# Patient Record
Sex: MALE | Race: WHITE | NOT HISPANIC OR LATINO | Employment: OTHER | RURAL
[De-identification: names, ages, dates, MRNs, and addresses within clinical notes are randomized per-mention and may not be internally consistent; named-entity substitution may affect disease eponyms.]

---

## 2024-11-13 ENCOUNTER — OFFICE VISIT (OUTPATIENT)
Dept: FAMILY MEDICINE CLINIC | Facility: CLINIC | Age: 49
End: 2024-11-13
Payer: COMMERCIAL

## 2024-11-13 VITALS
HEART RATE: 76 BPM | WEIGHT: 195 LBS | TEMPERATURE: 97.5 F | HEIGHT: 70 IN | BODY MASS INDEX: 27.92 KG/M2 | RESPIRATION RATE: 18 BRPM | SYSTOLIC BLOOD PRESSURE: 128 MMHG | OXYGEN SATURATION: 98 % | DIASTOLIC BLOOD PRESSURE: 76 MMHG

## 2024-11-13 DIAGNOSIS — R19.5 LOOSE BOWEL MOVEMENTS: ICD-10-CM

## 2024-11-13 DIAGNOSIS — Z11.59 NEED FOR HEPATITIS C SCREENING TEST: ICD-10-CM

## 2024-11-13 DIAGNOSIS — Z12.5 SCREENING FOR PROSTATE CANCER: ICD-10-CM

## 2024-11-13 DIAGNOSIS — Z00.00 ANNUAL PHYSICAL EXAM: ICD-10-CM

## 2024-11-13 DIAGNOSIS — R05.3 CHRONIC COUGH: ICD-10-CM

## 2024-11-13 DIAGNOSIS — Z76.89 ENCOUNTER TO ESTABLISH CARE: Primary | ICD-10-CM

## 2024-11-13 DIAGNOSIS — Z12.11 SCREENING FOR COLON CANCER: ICD-10-CM

## 2024-11-13 DIAGNOSIS — E66.3 OVERWEIGHT: ICD-10-CM

## 2024-11-13 NOTE — PROGRESS NOTES
Office Note     Name: Emmanuel Dillard    : 1975     MRN: [id number]     Chief Complaint  Annual Exam    Subjective     History of Present Illness:  Emmanuel Dillard is a 49 y.o. male who presents today to establish care with annual physical exam.  Patient also has acute complaint of chronic loose stools and chronic cough.  Patient currently retired from his long-term occupation as a .  He does not suffer from any chronic conditions such as hypertension or diabetes.  He does have family history of prostate cancer in his father.  No family history of colon cancer.  Patient states up-to-date on dental exams as well as eye exams.  In regards to his loose stools patient states this became an issue approximately 1 year ago.  He denies that they are watery in nature but feels they are softer than they should be.  He denies any frequency of stool.  He states that he cut back on lactose and felt that maybe there was some improvement.  He denies any abdominal cramping, abdominal pain, GI bleeding or bloating.  He is due for colonoscopy for colon cancer screening.  In regards to his cough patient states that he has not really noticed but his son and wife make note that he has a dry consistent cough for quite some time now.  Patient denies any underlying respiratory conditions such as COPD or asthma.  He denies any shortness of breath.  He has never been a cigarette smoker, however in his occupation as a  he obviously was exposed to occupational hazards.  He has no further complaints or concerns    The patient is being seen for a health maintenance evaluation.    Social History  Social History     Socioeconomic History    Marital status:    Tobacco Use    Smoking status: Never    Smokeless tobacco: Never   Vaping Use    Vaping status: Never Used   Substance and Sexual Activity    Alcohol use: Yes    Drug use: Never    Sexual activity: Yes       General History  Emmanuel  does have  "regular dental visits.  He does not complain of vision problems. Last eye exam was yearly  Immunizations are up to date.  Lifestyle  Emmanuel  consumes a  well balanced .  He exercises intermittently.    Reproductive Health  Emmanuel  is sexually active.   Screening  Family history of prostate cancer: yes, father and two paternal uncles  Last colonoscopy was   Last Completed Colonoscopy       This patient has no relevant Health Maintenance data.        . Family history of colon cancer: no      Review of Systems   Constitutional:  Negative for appetite change and fatigue.   HENT:  Negative for congestion, sore throat, trouble swallowing and voice change.    Eyes:  Negative for visual disturbance.   Respiratory:  Positive for cough. Negative for chest tightness, shortness of breath and wheezing.    Cardiovascular:  Negative for chest pain, palpitations and leg swelling.   Gastrointestinal:  Negative for abdominal distention, abdominal pain, anal bleeding, blood in stool, constipation, nausea, rectal pain and vomiting.        \"Soft stool\"   Endocrine: Negative for polydipsia and polyuria.   Genitourinary:  Negative for difficulty urinating, frequency, hematuria, testicular pain and urgency.   Musculoskeletal:  Negative for arthralgias and myalgias.   Neurological:  Negative for dizziness, weakness, light-headedness, numbness and headaches.   Hematological:  Does not bruise/bleed easily.   Psychiatric/Behavioral:  Negative for agitation, self-injury, sleep disturbance and suicidal ideas. The patient is not nervous/anxious.        Objective     Vital Signs  /76 (BP Location: Left arm, Patient Position: Sitting, Cuff Size: Adult)   Pulse 76   Temp 97.5 °F (36.4 °C) (Temporal)   Resp 18   Ht 177.8 cm (70\")   Wt 88.5 kg (195 lb)   SpO2 98%   BMI 27.98 kg/m²   Estimated body mass index is 27.98 kg/m² as calculated from the following:    Height as of this encounter: 177.8 cm (70\").    Weight as of this encounter: 88.5 kg " (195 lb).    BMI is >= 25 and <30. (Overweight) The following options were offered after discussion;: exercise counseling/recommendations and nutrition counseling/recommendations      Physical Exam  Vitals reviewed.   Constitutional:       Appearance: Normal appearance.   HENT:      Head: Normocephalic and atraumatic.      Right Ear: Tympanic membrane, ear canal and external ear normal.      Left Ear: Tympanic membrane, ear canal and external ear normal.      Nose: Nose normal.      Mouth/Throat:      Mouth: Mucous membranes are moist.      Pharynx: Oropharynx is clear.   Eyes:      Pupils: Pupils are equal, round, and reactive to light.   Cardiovascular:      Rate and Rhythm: Normal rate and regular rhythm.      Pulses: Normal pulses.      Heart sounds: Normal heart sounds.   Pulmonary:      Effort: Pulmonary effort is normal.      Breath sounds: Normal breath sounds.   Abdominal:      General: Bowel sounds are normal.      Palpations: Abdomen is soft.   Genitourinary:     Comments: defer  Musculoskeletal:         General: Normal range of motion.      Cervical back: Neck supple.   Skin:     General: Skin is warm and dry.      Capillary Refill: Capillary refill takes less than 2 seconds.   Neurological:      Mental Status: He is alert and oriented to person, place, and time.   Psychiatric:         Mood and Affect: Mood normal.         Behavior: Behavior normal.         Thought Content: Thought content normal.         Judgment: Judgment normal.          Assessment and Plan     Diagnoses and all orders for this visit:    1. Encounter to establish care (Primary)    2. Annual physical exam  -     Ambulatory Referral For Screening Colonoscopy  -     Comprehensive Metabolic Panel; Future  -     CBC & Differential; Future  -     Lipid Panel; Future  -     TSH Rfx On Abnormal To Free T4; Future  -     Testosterone; Future    3. Overweight  Assessment & Plan:  Patient's (Body mass index is 27.98 kg/m².) indicates that they  are overweight with health conditions that include none . Weight is unchanged. BMI is above average; BMI management plan is completed. We discussed portion control and increasing exercise.       4. Need for hepatitis C screening test  -     Hepatitis C Antibody; Future    5. Screening for prostate cancer  -     PSA Screen; Future    6. Screening for colon cancer  -     Ambulatory Referral For Screening Colonoscopy    7. Chronic cough  Assessment & Plan:  patient states that he has not really noticed but his son and wife make note that he has a dry consistent cough for quite some time now.  Patient denies any underlying respiratory conditions such as COPD or asthma.  He denies any shortness of breath.  He has never been a cigarette smoker, however in his occupation as a  he obviously was exposed to occupational hazards. Lungs CTA on exam today  -Will send for chest PA and lateral given high exposure to carcinogens as     Orders:  -     XR Chest PA & Lateral; Future    8. Loose bowel movements  Assessment & Plan:  In regards to his loose stools patient states this became an issue approximately 1 year ago.  He denies that they are watery in nature but feels they are softer than they should be.  He denies any frequency of stool.  He states that he cut back on lactose and felt that maybe there was some improvement.  He denies any abdominal cramping, abdominal pain, GI bleeding or bloating.  -Refer for screening colonoscopy. Discussed cutting back on lactose and closely monitoring for now          Follow Up  No follow-ups on file.    JEANNIE Martinez

## 2024-11-14 ENCOUNTER — PATIENT ROUNDING (BHMG ONLY) (OUTPATIENT)
Dept: FAMILY MEDICINE CLINIC | Facility: CLINIC | Age: 49
End: 2024-11-14
Payer: COMMERCIAL

## 2024-11-14 DIAGNOSIS — R05.3 CHRONIC COUGH: ICD-10-CM

## 2024-11-14 NOTE — PROGRESS NOTES
.A [facility] message has been sent to the patient for patient rounding with [facility] [place].

## 2024-11-15 ENCOUNTER — CLINICAL SUPPORT (OUTPATIENT)
Dept: FAMILY MEDICINE CLINIC | Facility: CLINIC | Age: 49
End: 2024-11-15
Payer: COMMERCIAL

## 2024-11-15 DIAGNOSIS — Z11.59 NEED FOR HEPATITIS C SCREENING TEST: ICD-10-CM

## 2024-11-15 DIAGNOSIS — Z00.00 ANNUAL PHYSICAL EXAM: ICD-10-CM

## 2024-11-15 DIAGNOSIS — Z12.5 SCREENING FOR PROSTATE CANCER: ICD-10-CM

## 2024-11-15 PROCEDURE — 36415 COLL VENOUS BLD VENIPUNCTURE: CPT | Performed by: NURSE PRACTITIONER

## 2024-11-15 NOTE — PROGRESS NOTES
Venipuncture Blood Specimen Collection  Venipuncture performed in left arm  by Nata Ramirez MA with good hemostasis. Patient tolerated the procedure well without complications.   11/15/24   Nata Ramirez MA

## 2024-11-16 LAB
ALBUMIN SERPL-MCNC: 4.4 G/DL (ref 4.1–5.1)
ALP SERPL-CCNC: 110 IU/L (ref 44–121)
ALT SERPL-CCNC: 40 IU/L (ref 0–44)
AST SERPL-CCNC: 27 IU/L (ref 0–40)
BASOPHILS # BLD AUTO: 0 X10E3/UL (ref 0–0.2)
BASOPHILS NFR BLD AUTO: 0 %
BILIRUB SERPL-MCNC: 0.4 MG/DL (ref 0–1.2)
BUN SERPL-MCNC: 13 MG/DL (ref 6–24)
BUN/CREAT SERPL: 10 (ref 9–20)
CALCIUM SERPL-MCNC: 9.5 MG/DL (ref 8.7–10.2)
CHLORIDE SERPL-SCNC: 105 MMOL/L (ref 96–106)
CHOLEST SERPL-MCNC: 235 MG/DL (ref 100–199)
CO2 SERPL-SCNC: 23 MMOL/L (ref 20–29)
CREAT SERPL-MCNC: 1.32 MG/DL (ref 0.76–1.27)
EGFRCR SERPLBLD CKD-EPI 2021: 67 ML/MIN/1.73
EOSINOPHIL # BLD AUTO: 0.4 X10E3/UL (ref 0–0.4)
EOSINOPHIL NFR BLD AUTO: 5 %
ERYTHROCYTE [DISTWIDTH] IN BLOOD BY AUTOMATED COUNT: 12.9 % (ref 11.6–15.4)
GLOBULIN SER CALC-MCNC: 2.3 G/DL (ref 1.5–4.5)
GLUCOSE SERPL-MCNC: 78 MG/DL (ref 70–99)
HCT VFR BLD AUTO: 50.7 % (ref 37.5–51)
HCV IGG SERPL QL IA: NON REACTIVE
HDLC SERPL-MCNC: 45 MG/DL
HGB BLD-MCNC: 17 G/DL (ref 13–17.7)
IMM GRANULOCYTES # BLD AUTO: 0 X10E3/UL (ref 0–0.1)
IMM GRANULOCYTES NFR BLD AUTO: 0 %
LDLC SERPL CALC-MCNC: 165 MG/DL (ref 0–99)
LYMPHOCYTES # BLD AUTO: 2.6 X10E3/UL (ref 0.7–3.1)
LYMPHOCYTES NFR BLD AUTO: 35 %
MCH RBC QN AUTO: 30.7 PG (ref 26.6–33)
MCHC RBC AUTO-ENTMCNC: 33.5 G/DL (ref 31.5–35.7)
MCV RBC AUTO: 92 FL (ref 79–97)
MONOCYTES # BLD AUTO: 0.6 X10E3/UL (ref 0.1–0.9)
MONOCYTES NFR BLD AUTO: 8 %
NEUTROPHILS # BLD AUTO: 3.8 X10E3/UL (ref 1.4–7)
NEUTROPHILS NFR BLD AUTO: 52 %
PLATELET # BLD AUTO: 323 X10E3/UL (ref 150–450)
POTASSIUM SERPL-SCNC: 4.7 MMOL/L (ref 3.5–5.2)
PROT SERPL-MCNC: 6.7 G/DL (ref 6–8.5)
PSA SERPL-MCNC: 0.6 NG/ML (ref 0–4)
RBC # BLD AUTO: 5.54 X10E6/UL (ref 4.14–5.8)
SODIUM SERPL-SCNC: 142 MMOL/L (ref 134–144)
TESTOST SERPL-MCNC: 476 NG/DL (ref 264–916)
TRIGL SERPL-MCNC: 139 MG/DL (ref 0–149)
TSH SERPL DL<=0.005 MIU/L-ACNC: 3 UIU/ML (ref 0.45–4.5)
VLDLC SERPL CALC-MCNC: 25 MG/DL (ref 5–40)
WBC # BLD AUTO: 7.4 X10E3/UL (ref 3.4–10.8)

## 2024-11-17 PROBLEM — R19.5 LOOSE BOWEL MOVEMENTS: Status: ACTIVE | Noted: 2024-11-17

## 2024-11-17 NOTE — ASSESSMENT & PLAN NOTE
Patient's (Body mass index is 27.98 kg/m².) indicates that they are overweight with health conditions that include none . Weight is unchanged. BMI is above average; BMI management plan is completed. We discussed portion control and increasing exercise.

## 2024-11-17 NOTE — ASSESSMENT & PLAN NOTE
patient states that he has not really noticed but his son and wife make note that he has a dry consistent cough for quite some time now.  Patient denies any underlying respiratory conditions such as COPD or asthma.  He denies any shortness of breath.  He has never been a cigarette smoker, however in his occupation as a  he obviously was exposed to occupational hazards. Lungs CTA on exam today  -Will send for chest PA and lateral given high exposure to carcinogens as

## 2024-11-17 NOTE — ASSESSMENT & PLAN NOTE
In regards to his loose stools patient states this became an issue approximately 1 year ago.  He denies that they are watery in nature but feels they are softer than they should be.  He denies any frequency of stool.  He states that he cut back on lactose and felt that maybe there was some improvement.  He denies any abdominal cramping, abdominal pain, GI bleeding or bloating.  -Refer for screening colonoscopy. Discussed cutting back on lactose and closely monitoring for now

## 2024-11-18 ENCOUNTER — TELEPHONE (OUTPATIENT)
Dept: FAMILY MEDICINE CLINIC | Facility: CLINIC | Age: 49
End: 2024-11-18
Payer: COMMERCIAL

## 2024-11-18 RX ORDER — ATORVASTATIN CALCIUM 10 MG/1
10 TABLET, FILM COATED ORAL DAILY
Qty: 90 TABLET | Refills: 1 | Status: SHIPPED | OUTPATIENT
Start: 2024-11-18

## 2024-11-18 NOTE — TELEPHONE ENCOUNTER
----- Message from Emmy Espinal sent at 11/18/2024 12:58 PM EST -----  Please let Emmanuel know I have reviewed his CXR which was normal. His labs are showing his cholesterol and LDL are elevated. I am going to start a low dose statin medication, atorvastatin 10mg daily. He would also benefit from starting a daily omega-3 supplement. We can recheck his lipids in three months. Everything else looked great

## 2024-11-18 NOTE — TELEPHONE ENCOUNTER
Called and spoke with patient advised him of lab results and to come back in 3 months to have labs redone, he verbalized understanding